# Patient Record
Sex: FEMALE | Race: AMERICAN INDIAN OR ALASKA NATIVE
[De-identification: names, ages, dates, MRNs, and addresses within clinical notes are randomized per-mention and may not be internally consistent; named-entity substitution may affect disease eponyms.]

---

## 2018-07-17 NOTE — EMERGENCY DEPARTMENT REPORT
HPI





- General


Chief Complaint: Abdominal Pain


Time Seen by Provider: 07/17/18 21:30





- HPI


HPI: 








The patient is a 23-year-old female presents for evaluation of abdominal pain.  

The patient reports suprapubic abdominal pain for the past 4 days, crampy in 

quality, moderate severity, radiating to the bilateral flanks.  She also 

reports associated dysuria. The patient denies fever, chills, night sweats, 

diarrhea, blood in the stool, dark tarry stool, genital discharge, inability to 

pass flatus. 








ED Past Medical Hx





- Past Medical History


Previous Medical History?: No





- Surgical History


Additional Surgical History: C/S





- Social History


Smoking Status: Never Smoker


Substance Use Type: None





- Medications


Home Medications: 


 Home Medications











 Medication  Instructions  Recorded  Confirmed  Last Taken  Type


 


HYDROcodone/APAP 7.5-325 [Norco 1 each PO Q8HR PRN #15 tablet 07/18/18  Unknown 

Rx





7.5-325 mg TAB]     


 


Ibuprofen [Motrin] 600 mg PO Q8H PRN #30 tablet 07/18/18  Unknown Rx


 


Levofloxacin [Levaquin] 750 mg PO QDAY #7 tablet 07/18/18  Unknown Rx














ED Review of Systems


ROS: 


Stated complaint: PAIN/NAUSEA VOMITING


Other details as noted in HPI





Constitutional: denies: fever


ENT: denies: throat or neck pain


Respiratory: denies: cough, shortness of breath


Cardiovascular: denies: chest pain


Endocrine: denies unexplained weight loss or gain


Gastrointestinal: reports abdominal pain, nausea


Genitourinary: reports flank pain, dysuria


Musculoskeletal: denies: leg swelling


Skin: denies: rash


Neurological: denies: headache


Hematological/Lymphatic: denies: easy bleeding or easy bruising  


Psych: denies sadness or hopelessness








Physical Exam





- Physical Exam


Vital Signs: 


 Vital Signs











  07/17/18 07/17/18 07/17/18





  18:43 21:36 21:45


 


Temperature 101.9 F H 98.4 F 


 


Pulse Rate 112 H 80 


 


Respiratory 20 14 14





Rate   


 


Blood Pressure 107/62  


 


Blood Pressure  98/64 





[Left]   


 


O2 Sat by Pulse 97 100 100





Oximetry   











Physical Exam: 








General: well-nourished, well-developed, no acute distress


Head: Normocephalic, atraumatic


Eyes: normal sclera


ENT: Mucous membranes are pale and dry


Neck: No neck stiffness, no cervical adenopathy


Respiratory: Breath sounds equal bilaterally, no wheezing, rales, or rhonchi


Cardio: S1 and S2 present, no murmurs, rubs, gallops, capillary refill is 

delayed


Abdomen: Normoactive bowel sounds, soft abdomen, suprapubic tenderness to 

palpation present, no rigidity, no guarding or rebound tenderness


Chest WALL/Back: No tenderness to palpation of the chest wall, positive 

bilateral CVA tenderness with percussion is present


Musc: No pitting edema


Skin: No rash


Neuro: no facial drooping, normal speech


Psych: Normal affect








ED Course


 Vital Signs











  07/17/18 07/17/18 07/17/18





  18:43 21:36 21:45


 


Temperature 101.9 F H 98.4 F 


 


Pulse Rate 112 H 80 


 


Respiratory 20 14 14





Rate   


 


Blood Pressure 107/62  


 


Blood Pressure  98/64 





[Left]   


 


O2 Sat by Pulse 97 100 100





Oximetry   














ED Medical Decision Making





- Lab Data


Result diagrams: 


 07/17/18 20:48





 07/17/18 20:48





- Medical Decision Making








The patient was seen and examined by myself.  The patient is placed on a 

cardiac monitor and continuous pulse ox. On initial evaluation, the patient was 

found to be in no distress.  Evaluation orders are placed.  IV access is 

established and the patient is given Tylenol for her fever, 1 L normal saline 

fluid bolus and Zofran for nausea, and IV analgesic for pain. Lab results 

revealed significantly elevated urine WBC with positive leukocyte esterase, 

consistent with urinary tract infection, and otherwise labs were not concerning 

including normal renal function and negative pregnancy test.  The patient is 

given IV Levaquin for treatment of probable pyelonephritis, as she was found to 

have UTI with fever, and bilateral flank tenderness to percussion.  The patient 

was reevaluated and reported that her symptoms were markedly improved.  The 

patient is stable for discharge with outpatient follow-up.  The patient is 

given follow-up and return instructions.  The patient expressed understanding 

and agreed with the plan.  The patient is discharged in stable condition with a 

prescription for high-dose Levaquin for treatment of pyelonephritis.





Critical care attestation.: 


If time is entered above; I have spent that time in minutes in the direct care 

of this critically ill patient, excluding procedure time.








ED Disposition


Clinical Impression: 


 Dehydration, Pyelonephritis, Acute upper urinary tract infection





Disposition: DC-01 TO HOME OR SELFCARE


Is pt being admited?: No


Does the pt Need Aspirin: No


Condition: Stable


Instructions:  Abdominal Pain (ED), Urinary Tract Infection in Women (ED)


Referrals: 


[facility] [Outside] - 3-5 Days


Time of Disposition: 23:02

## 2021-12-01 ENCOUNTER — HOSPITAL ENCOUNTER (EMERGENCY)
Dept: HOSPITAL 5 - ED | Age: 26
Discharge: LEFT BEFORE DISCHARGE | End: 2021-12-01
Payer: MEDICAID

## 2021-12-01 DIAGNOSIS — Z53.21: ICD-10-CM

## 2021-12-01 DIAGNOSIS — N93.9: Primary | ICD-10-CM
